# Patient Record
Sex: FEMALE | Race: BLACK OR AFRICAN AMERICAN | NOT HISPANIC OR LATINO | URBAN - METROPOLITAN AREA
[De-identification: names, ages, dates, MRNs, and addresses within clinical notes are randomized per-mention and may not be internally consistent; named-entity substitution may affect disease eponyms.]

---

## 2023-02-22 LAB
SARS-COV-2 RESULT: NORMAL
SARS-COV-2 RESULT: NOT DETECTED

## 2023-02-23 LAB
FLU A RESULT: NOT DETECTED
FLU B RESULT: NOT DETECTED

## 2023-04-28 LAB — SARS-COV-2 RESULT: DETECTED

## 2024-10-29 ENCOUNTER — CLINICAL SUPPORT (OUTPATIENT)
Dept: PRIMARY CARE | Facility: CLINIC | Age: 24
End: 2024-10-29
Payer: COMMERCIAL

## 2024-10-29 DIAGNOSIS — Z11.1 SCREENING FOR TUBERCULOSIS: ICD-10-CM

## 2024-10-29 PROCEDURE — 86580 TB INTRADERMAL TEST: CPT | Performed by: FAMILY MEDICINE

## 2024-12-23 ENCOUNTER — TELEPHONE (OUTPATIENT)
Dept: PRIMARY CARE | Facility: CLINIC | Age: 24
End: 2024-12-23

## 2024-12-23 ENCOUNTER — OFFICE VISIT (OUTPATIENT)
Dept: PRIMARY CARE | Facility: CLINIC | Age: 24
End: 2024-12-23
Payer: COMMERCIAL

## 2024-12-23 VITALS
BODY MASS INDEX: 38.61 KG/M2 | RESPIRATION RATE: 18 BRPM | DIASTOLIC BLOOD PRESSURE: 90 MMHG | OXYGEN SATURATION: 99 % | TEMPERATURE: 97.2 F | HEART RATE: 77 BPM | SYSTOLIC BLOOD PRESSURE: 130 MMHG | WEIGHT: 246 LBS | HEIGHT: 67 IN

## 2024-12-23 DIAGNOSIS — J06.9 UPPER RESPIRATORY TRACT INFECTION, UNSPECIFIED TYPE: Primary | ICD-10-CM

## 2024-12-23 PROBLEM — R25.2 CRAMP IN LIMB: Status: ACTIVE | Noted: 2024-12-23

## 2024-12-23 PROBLEM — G43.909 MIGRAINE HEADACHE: Status: ACTIVE | Noted: 2024-12-23

## 2024-12-23 PROBLEM — R11.2 NAUSEA AND VOMITING: Status: ACTIVE | Noted: 2024-12-23

## 2024-12-23 PROBLEM — K61.1 PERIRECTAL ABSCESS: Status: ACTIVE | Noted: 2018-12-14

## 2024-12-23 PROCEDURE — 3008F BODY MASS INDEX DOCD: CPT | Performed by: FAMILY MEDICINE

## 2024-12-23 PROCEDURE — 99213 OFFICE O/P EST LOW 20 MIN: CPT | Performed by: FAMILY MEDICINE

## 2024-12-23 RX ORDER — FLUTICASONE PROPIONATE 50 MCG
2 SPRAY, SUSPENSION (ML) NASAL DAILY
Qty: 16 G | Refills: 1 | Status: SHIPPED | OUTPATIENT
Start: 2024-12-23

## 2024-12-23 RX ORDER — ONDANSETRON 4 MG/1
4 TABLET, ORALLY DISINTEGRATING ORAL EVERY 8 HOURS PRN
Qty: 20 TABLET | Refills: 0 | Status: SHIPPED | OUTPATIENT
Start: 2024-12-23

## 2024-12-23 ASSESSMENT — ENCOUNTER SYMPTOMS
RHINORRHEA: 1
FEVER: 0
COUGH: 1

## 2024-12-23 NOTE — LETTER
December 23, 2024     Patient: Sindy Magana   YOB: 2000   Date of Visit: 12/23/2024       To Whom It May Concern:    Sindy Magana was seen in my clinic on 12/23/2024 at . Please excuse Sindy for her absence from work on 12/22/24 through 12/23/24    If you have any questions or concerns, please don't hesitate to call.         Sincerely,       Dr Kye Brown D.O.

## 2024-12-23 NOTE — PROGRESS NOTES
"Subjective   Patient ID: Sindy Magana is a 24 y.o. female who presents for Sick Visit.    HPI     She developed URI symptoms 2 days ago  Main symptoms have been nasal congestion and drainage.  Some of the nasal drainage has been more yellow in color.  She has a mild cough and also nausea.  No significant abdominal pain.  No fever or sore throat  Taking over-the-counter decongestants to treat symptoms  Most bothersome symptom is nasal congestion/drainage      Review of Systems   Constitutional:  Negative for fever.   HENT:  Positive for congestion and rhinorrhea.    Respiratory:  Positive for cough.        Objective   /90   Pulse 77   Temp 36.2 °C (97.2 °F)   Resp 18   Ht 1.702 m (5' 7\")   Wt 112 kg (246 lb)   SpO2 99%   BMI 38.53 kg/m²     Physical Exam  Vitals reviewed.   Constitutional:       General: She is not in acute distress.     Appearance: Normal appearance.   HENT:      Head: Normocephalic.      Right Ear: Tympanic membrane, ear canal and external ear normal.      Left Ear: Tympanic membrane, ear canal and external ear normal.      Nose: Congestion present.      Mouth/Throat:      Mouth: Mucous membranes are moist.      Pharynx: No oropharyngeal exudate or posterior oropharyngeal erythema.   Eyes:      Conjunctiva/sclera: Conjunctivae normal.   Cardiovascular:      Rate and Rhythm: Normal rate and regular rhythm.      Heart sounds: Normal heart sounds.   Pulmonary:      Effort: Pulmonary effort is normal.      Breath sounds: Normal breath sounds.   Lymphadenopathy:      Cervical: No cervical adenopathy.   Skin:     Findings: No rash.   Neurological:      Mental Status: She is alert.   Psychiatric:         Mood and Affect: Mood normal.         Behavior: Behavior normal.         Assessment/Plan   Assessment & Plan  Upper respiratory tract infection, unspecified type    Orders:    fluticasone (Flonase) 50 mcg/actuation nasal spray; Administer 2 sprays into each nostril once daily. Shake gently. " Before first use, prime pump. After use, clean tip and replace cap.    ondansetron ODT (Zofran-ODT) 4 mg disintegrating tablet; Dissolve 1 tablet (4 mg) in the mouth every 8 hours if needed for nausea or vomiting.    There are no signs of any bacterial infection on exam today.  This would most likely be a viral URI.  We discussed symptomatic care and will start Flonase for nasal congestion/drainage, and Zofran as needed for nausea.  She does not feel that the cough is severe enough to need an antitussive medication at this time.  Recommend rest and symptomatic care and call or follow-up if symptoms are not improving in the next 5 to 7 days